# Patient Record
(demographics unavailable — no encounter records)

---

## 2025-07-21 NOTE — END OF VISIT
[FreeTextEntry3] : Documented by Deandra Morton acting as a scribe for Allyson Serna on 07/15/2025.   All medical record entries made by the Scribe were at my, Dr. Allyson Serna, direction and personally dictated by me on 07/15/2025. I have reviewed the chart and agree that the record accurately reflects my personal performance of the history, physical exam, assessment, and plan. I have also personally directed, reviewed, and agreed with the chart.  [Time Spent: ___ minutes] : I have spent [unfilled] minutes of time on the encounter which excludes teaching and separately reported services.

## 2025-07-21 NOTE — ADDENDUM
[FreeTextEntry1] : I, Deandra Morton ([facility]) assisted in filling out this chart under the dictation of Allyson Serna on 07/15/2025.

## 2025-07-21 NOTE — HISTORY OF PRESENT ILLNESS
[de-identified] : JOSE ALEX is a 31-year-old male who presents today with complaints of hip pain left and lower back for the past 2-3 years. The pain is described as sharp, aching, and throbbing, and is constant. Patient reports the pain began due to a fall in October 2023, has been present over the years, and is getting worse, but yet stabilized. Symptoms are exacerbated by walking, standing, bending, lifting, etc. Symptoms are alleviated by NSAIDs, etc. Associated symptoms include stiffness and weakness. Patient tried medications, physical therapy, and imaging with minimal relief. The pain has not improved despite undergoing physical therapy. The patient is currently experiencing daily pain, particularly when sitting up, and describes the pain as severe. He reports no numbness, tingling, or shooting pain down the legs, and no changes in bladder or bowel function. The patient is not currently working but was previously employed in the , [employer], and left about a month ago. He is looking for work in the [place] field. The patient engages in sports-specific exercises like basketball and cardio. He takes ibuprofen as needed for pain relief.

## 2025-07-21 NOTE — HISTORY OF PRESENT ILLNESS
[de-identified] : JOSE ALEX is a 31-year-old male who presents today with complaints of hip pain left and lower back for the past 2-3 years. The pain is described as sharp, aching, and throbbing, and is constant. Patient reports the pain began due to a fall in October 2023, has been present over the years, and is getting worse, but yet stabilized. Symptoms are exacerbated by walking, standing, bending, lifting, etc. Symptoms are alleviated by NSAIDs, etc. Associated symptoms include stiffness and weakness. Patient tried medications, physical therapy, and imaging with minimal relief. The pain has not improved despite undergoing physical therapy. The patient is currently experiencing daily pain, particularly when sitting up, and describes the pain as severe. He reports no numbness, tingling, or shooting pain down the legs, and no changes in bladder or bowel function. The patient is not currently working but was previously employed in the , specifically [employer], and left about a month ago. He is looking for work in the [employer] field. The patient engages in sports-specific exercises like basketball and cardio. He takes ibuprofen as needed for pain relief.

## 2025-07-21 NOTE — ADDENDUM
[FreeTextEntry1] : I, Deandra Morton ([facility]) assisted in filling out this chart under the dictation of Allyson Serna on 07/15/2025.

## 2025-07-21 NOTE — ASSESSMENT
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 We reviewed the natural history of this condition and treatment options ranging from conservative measures (activity modification, physical therapy, icing, oral anti-inflammatory and/or analgesic medications, steroid injection, HA gel injections, PRP injections) to surgical management. We agreed on the following plan:   Reviewed MRI L spine and bilateral hips reports from 9/18/24 with the patient today and scanned into chart. Start Home Exercises for hamstring and lumbar spine conditioning. Demonstration and handout provided. Start physical therapy. Referral provided. Medication: Ibuprofen as needed.  Provided contact information for pain management to discuss possible injection options. Follow up in 8 weeks.

## 2025-07-21 NOTE — PHYSICAL EXAM
[de-identified] : Obtained at      on     was reviewed with the patient demonstrating;  [de-identified] : General: Well-nourished, well-developed, alert, and in no acute distress.  Head: Normocephalic.  Eyes: Pupils equal, extraocular muscles intact, normal sclera.  Nose: No nasal discharge.  Cardiovascular: Extremities are warm and well-perfused. Distal pulses are symmetric bilaterally.  Respiratory: No labored breathing.  Extremities: Sensation is intact distally bilaterally. Distal pulses are symmetric bilaterally  Lymphatic: No regional lymphadenopathy, no lymphedema  Neurologic: No focal deficits  Skin: Normal skin color, texture, and turgor  Psychiatric: Normal affect   MSK: Examination of the Lumbar Spine: Gait: Non-antalgic Ambulating Able to toe walk, heel walk, tandem walk AROM: Pain with supple hamstring with forward flexion, extension, lateral flexion, rotation TTP: Midline and paraspinals Non-tender to palpation: SI jt, GTB, piriformis, gluteals   Lumbar Facet Loading positive Prone-resistant knee flexion.   Right hip Range of Motion: Internal rotation: [25] degrees, External rotation: [80] degrees, Flexion [120] degrees     Log roll negative DARON hip and lumbar pain FADIR hip and lumbar pain Tatyana positive Ely negative   SLR negative. Positive for pain in the proximal hamstring and lumbar spine.  Piriformis compression negative ASIS distraction negative Iliac compression negative   Left hip:   Range of Motion: Internal rotation: [25] degrees, External rotation: [80] degrees, Flexion [120] degrees   Special tests: DARON hip and lumbar pain FADIR hip and lumbar pain Tatyana positive Ely negative   SLR negative. Positive for pain in the proximal hamstring and lumbar spine.  Piriformis compression negative ASIS distraction negative Iliac compression negative     Sensation is intact to light touch over the superficial and deep peroneal nerve distributions and the posterior tibial nerve distribution. Capillary refill is less than two seconds. Posterior tibial and dorsalis pedis pulses 2+ equal bilaterally. No calf swelling or tenderness bilaterally. Strength testing shows Hip flexion 5/5, Hip adduction 5/5, Hip abduction 5/5, Knee Extension 5/5, Knee Flexion 5/5, dorsiflexion 5/5, plantar flexion 5/5, EHL 5/5 Reflexes: Patellar 2+, Achilles 2+. Babinski negative.

## 2025-07-21 NOTE — DISCUSSION/SUMMARY

## 2025-07-21 NOTE — DISCUSSION/SUMMARY

## 2025-07-21 NOTE — PHYSICAL EXAM
[de-identified] : Obtained at      on     was reviewed with the patient demonstrating;  [de-identified] : General: Well-nourished, well-developed, alert, and in no acute distress.  Head: Normocephalic.  Eyes: Pupils equal, extraocular muscles intact, normal sclera.  Nose: No nasal discharge.  Cardiovascular: Extremities are warm and well-perfused. Distal pulses are symmetric bilaterally.  Respiratory: No labored breathing.  Extremities: Sensation is intact distally bilaterally. Distal pulses are symmetric bilaterally  Lymphatic: No regional lymphadenopathy, no lymphedema  Neurologic: No focal deficits  Skin: Normal skin color, texture, and turgor  Psychiatric: Normal affect   MSK: Examination of the Lumbar Spine: Gait: Non-antalgic Ambulating Able to toe walk, heel walk, tandem walk AROM: Pain with supple hamstring with forward flexion, extension, lateral flexion, rotation TTP: Midline and paraspinals Non-tender to palpation: SI jt, GTB, piriformis, gluteals   Lumbar Facet Loading positive Prone-resistant knee flexion.   Right hip Range of Motion: Internal rotation: [25] degrees, External rotation: [80] degrees, Flexion [120] degrees     Log roll negative DARON hip and lumbar pain FADIR hip and lumbar pain Tatyana positive Ely negative   SLR negative. Positive for pain in the proximal hamstring and lumbar spine.  Piriformis compression negative ASIS distraction negative Iliac compression negative   Left hip:   Range of Motion: Internal rotation: [25] degrees, External rotation: [80] degrees, Flexion [120] degrees   Special tests: DARON hip and lumbar pain FADIR hip and lumbar pain Tatyana positive Ely negative   SLR negative. Positive for pain in the proximal hamstring and lumbar spine.  Piriformis compression negative ASIS distraction negative Iliac compression negative     Sensation is intact to light touch over the superficial and deep peroneal nerve distributions and the posterior tibial nerve distribution. Capillary refill is less than two seconds. Posterior tibial and dorsalis pedis pulses 2+ equal bilaterally. No calf swelling or tenderness bilaterally. Strength testing shows Hip flexion 5/5, Hip adduction 5/5, Hip abduction 5/5, Knee Extension 5/5, Knee Flexion 5/5, dorsiflexion 5/5, plantar flexion 5/5, EHL 5/5 Reflexes: Patellar 2+, Achilles 2+. Babinski negative.